# Patient Record
Sex: FEMALE | Race: WHITE | NOT HISPANIC OR LATINO | URBAN - METROPOLITAN AREA
[De-identification: names, ages, dates, MRNs, and addresses within clinical notes are randomized per-mention and may not be internally consistent; named-entity substitution may affect disease eponyms.]

---

## 2024-05-16 ENCOUNTER — EMERGENCY (EMERGENCY)
Facility: HOSPITAL | Age: 69
LOS: 0 days | Discharge: HOME | End: 2024-05-16
Attending: EMERGENCY MEDICINE
Payer: MEDICARE

## 2024-05-16 VITALS — SYSTOLIC BLOOD PRESSURE: 156 MMHG | DIASTOLIC BLOOD PRESSURE: 79 MMHG

## 2024-05-16 VITALS
OXYGEN SATURATION: 99 % | TEMPERATURE: 98 F | DIASTOLIC BLOOD PRESSURE: 85 MMHG | SYSTOLIC BLOOD PRESSURE: 164 MMHG | HEART RATE: 78 BPM | RESPIRATION RATE: 20 BRPM

## 2024-05-16 DIAGNOSIS — T46.1X1A POISONING BY CALCIUM-CHANNEL BLOCKERS, ACCIDENTAL (UNINTENTIONAL), INITIAL ENCOUNTER: ICD-10-CM

## 2024-05-16 DIAGNOSIS — Z03.6 ENCOUNTER FOR OBSERVATION FOR SUSPECTED TOXIC EFFECT FROM INGESTED SUBSTANCE RULED OUT: ICD-10-CM

## 2024-05-16 DIAGNOSIS — I10 ESSENTIAL (PRIMARY) HYPERTENSION: ICD-10-CM

## 2024-05-16 PROCEDURE — 99283 EMERGENCY DEPT VISIT LOW MDM: CPT

## 2024-05-16 PROCEDURE — 99285 EMERGENCY DEPT VISIT HI MDM: CPT

## 2024-05-16 NOTE — ED PROVIDER NOTE - CHIEF COMPLAINT
Patient has an appointment to establish with Dr. Méndez however she would prefer not to go to [place] so she is requesting to establish with Shwetha Krause.  Patient has been dismissed from Dr. Mo.  Please call patient to let her know if she is able to schedule with Shwetha.       The patient is a 68y Female complaining of medical evaluation.

## 2024-05-16 NOTE — ED PROVIDER NOTE - CLINICAL SUMMARY MEDICAL DECISION MAKING FREE TEXT BOX
Patient maintaining normal HR and BP 3+ hours after ingestion. No indication for further monitoring at this time. This was accidental, no concern for SI.    advised on sx monitoring, return precautions, follow up.

## 2024-05-16 NOTE — ED PROVIDER NOTE - ED STEMI HIDDEN
Writer spoke with patient and conveyed message below. Writer reviewed [facility] lab directions with patient. Patient verbalized understanding and denied further questions or concerns at this time.            ----- Message from Tima Amanda MD sent at 11/1/2019  9:58 AM CDT -----  Please notify patient      Calcium score indicates moderate risk for CAD  Would not change treatment at this time but will discuss at Dec OV when lipid panel is done        
hide

## 2024-05-16 NOTE — ED PROVIDER NOTE - OBJECTIVE STATEMENT
67 yo F, hx of HTN takes AM losartan, PM Cardizem here after accidentally taking a 2nd dose of 300mg of cardizem from her pill case after she looked in the wrong day.     Happened around 2150, called poison control who recommended she come to ED for eval.    She has no complaints. No Cp, dyspnea, palpitations, lightheadedness, weakness, nausea. Tazorac Pregnancy And Lactation Text: This medication is not safe during pregnancy. It is unknown if this medication is excreted in breast milk.

## 2024-05-16 NOTE — ED PROVIDER NOTE - PATIENT PORTAL LINK FT
You can access the FollowMyHealth Patient Portal offered by [facility] by registering at the following website: [email or web address]. By joining [facility]’s FollowMyHealth portal, you will also be able to view your health information using other applications (apps) compatible with our system.

## 2024-05-16 NOTE — ED PROVIDER NOTE - NSFOLLOWUPINSTRUCTIONS_ED_ALL_ED_FT
Accidental Drug Poisoning, Adult  Accidental drug poisoning happens when a person accidentally takes too much of a substance, such as a prescription medicine, an over-the-counter medicine, a vitamin, a supplement, or an illegal drug. The effects of drug poisoning can be mild, dangerous, or even deadly.    What are the causes?  This condition is caused by taking too much of a medicine, illegal drug, or other substance. It often results from:  Lack of knowledge about a substance.  Using more than one substance at the same time.  An error made by the health care provider during prescribing or dispensing of the drug.  A lapse in memory, such as forgetting that you have already taken a dose of the medicine.  Suddenly using a substance after a long period of not using it.  The following substances and medicines are more likely to cause an accidental drug poisoning:  Medicines that treat mental health conditions (psychotropic medicines).  Pain medicines.  Cocaine.  Heroin.  Multivitamins that contain iron.  Over-the-counter cold and cough medicines.  What increases the risk?  This condition is more likely to occur in:  Aging adults. Aging adults are at risk because they may:  Be taking many different medicines.  Have difficulty reading labels.  Forget when they last took their medicine.  People who use illegal drugs.  People who drink alcohol while using illegal drugs or certain medicines.  People with certain mental health conditions.  What are the signs or symptoms?  Symptoms of this condition depend on the substance and the amount that was taken. Common symptoms include:  Behavior changes, such as confusion.  Sleepiness.  Weakness.  Slowed breathing.  Nausea and vomiting.  Seizures.  Very large or small eye pupil size that does not change in response to changes in light.  A drug poisoning can cause a very serious condition in which your blood pressure drops to a low level (shock). Symptoms of shock include:  Cold, clammy, or pale skin.  Blue lips.  Very slow breathing.  Extreme sleepiness.  Severe confusion.  Dizziness or fainting.  How is this diagnosed?  This condition is diagnosed based on:  Your symptoms. You will be asked about the substances you took and when you took them.  A physical exam.  You may also have tests, including:  Urine tests.  Blood tests.  An electrocardiogram (ECG).  How is this treated?  This condition may need to be treated right away at the hospital. Treatment may involve:  Getting fluids and electrolytes through an IV. Electrolytes are salts and minerals in the blood.  Having a breathing tube inserted in your airway (endotracheal tube) to help you breathe.  Taking or receiving medicines. These may include medicines that:  Absorb any substance that is in your digestive system.  Block or reverse the effect of the substance that caused the drug poisoning.  Having your blood filtered through an artificial kidney machine (hemodialysis).  Ongoing counseling and mental health support. This may be provided if you used an illegal drug.  Follow these instructions at home:  Medicines    A prescription pill bottle with an example of a pill.  Take over-the-counter and prescription medicines only as told by your health care provider.  Before taking a new medicine, ask your health care provider whether the medicine:  May cause side effects.  Might react with other medicines.  Keep a list of all the medicines that you take, including over-the-counter medicines, vitamins, supplements, and herbs. Bring this list with you to all of your medical visits.  General instructions    Three cups showing dark yellow, yellow, and pale yellow urine.  Drink enough fluid to keep your urine pale yellow.  If you are working with a counselor or mental health professional, make sure to follow instructions given.  Do not drink alcohol if:  Your health care provider tells you not to drink.  You are pregnant, may be pregnant, or are planning to become pregnant.  If you drink alcohol:  Limit how much you have to:  0–1 drink a day for women.  0–2 drinks a day for men.  Know how much alcohol is in your drink. In the [place] one drink equals one 12 oz bottle of beer (355 mL), one 5 oz glass of wine (148 mL), or one 1½ oz glass of hard liquor (44 mL).  Keep all follow-up visits. This is important.  How is this prevented?  A pillbox with seven slots for storing pills. The lids for three slots are open, showing pills in two of the slots.   Get help if you are struggling with:  Alcohol or drug use.  Depression or another mental health condition.  Keep the phone number of your local poison control center near your phone or on your mobile phone. The hotline of the [employer] of Poison Control Centers is 1-112.495.4629.  Read the drug inserts that come with your medicines.  Create a system for taking your medicine, such as a pillbox, that will help you avoid taking too much of the medicine.  Do not drink alcohol while taking medicines unless your health care provider approves.  Do not use illegal drugs.  Do not take medicines that are not prescribed for you.  Contact a health care provider if:  Your symptoms return.  You develop new symptoms or side effects after taking a medicine.  You have questions about possible drug poisoning. Call your local poison control center at 1-943.548.6282.  Get help right away if:  You think that you or someone else may have taken too much of a substance.  You or someone else are having symptoms of accidental drug poisoning:  Behavior changes, such as confusion.  Sleepiness.  Slowed breathing.  Seizures.  These symptoms may be an emergency. Get help right away. Call 911.  Do not wait to see if the symptoms will go away.  Do not drive yourself to the hospital.  Summary  Accidental drug poisoning happens when a person accidentally takes too much of a substance, such as a prescription medicine, an over-the-counter medicine, a vitamin, a supplement, or an illegal drug.  This condition is diagnosed based on your symptoms and a physical exam. You will be asked to tell your health care provider which substances you took and when you took them.  The effects of drug poisoning can be mild, dangerous, or even deadly.  This condition may need to be treated right away at the hospital.  This information is not intended to replace advice given to you by your health care provider. Make sure you discuss any questions you have with your health care provider.

## 2024-05-16 NOTE — ED PROVIDER NOTE - PHYSICAL EXAMINATION
VITAL SIGNS: I have reviewed nursing notes and confirm.  CONSTITUTIONAL: Well-developed; well-nourished; in no acute distress.  SKIN: Skin exam is warm and dry, no acute rash.  HEAD: Normocephalic; atraumatic.  EYES: PERRL, EOM intact; conjunctiva and sclera clear.  ENT: No nasal discharge; airway clear.   NECK: Supple; non tender.  CARD: S1, S2 normal; no murmurs, gallops, or rubs. Regular rate and rhythm.  RESP: No wheezes, rales or rhonchi.  ABD: Normal bowel sounds; soft; non-distended; non-tender  EXT: Normal ROM.   NEURO: Alert, oriented. Grossly unremarkable. No focal deficits.  PSYCH: Cooperative, appropriate.